# Patient Record
Sex: MALE | URBAN - METROPOLITAN AREA
[De-identification: names, ages, dates, MRNs, and addresses within clinical notes are randomized per-mention and may not be internally consistent; named-entity substitution may affect disease eponyms.]

---

## 2022-12-30 ENCOUNTER — OFFICE VISIT (OUTPATIENT)
Dept: URBAN - METROPOLITAN AREA CLINIC 28 | Facility: CLINIC | Age: 77
End: 2022-12-30

## 2022-12-30 DIAGNOSIS — H16.012 CENTRAL CORNEAL ULCER, LEFT EYE: ICD-10-CM

## 2022-12-30 DIAGNOSIS — B00.9 HERPES SIMPLEX INFECTION: Primary | ICD-10-CM

## 2022-12-30 PROCEDURE — 99203 OFFICE O/P NEW LOW 30 MIN: CPT | Performed by: OPTOMETRIST

## 2022-12-30 RX ORDER — ACYCLOVIR 400 MG/1
400 MG TABLET ORAL
Qty: 30 | Refills: 0 | Status: ACTIVE
Start: 2022-12-30

## 2022-12-30 RX ORDER — GANCICLOVIR 1.5 MG/G
0.15 % GEL OPHTHALMIC
Qty: 5 | Refills: 0 | Status: ACTIVE
Start: 2022-12-30

## 2022-12-30 ASSESSMENT — INTRAOCULAR PRESSURE
OD: 18
OS: 20

## 2022-12-30 NOTE — IMPRESSION/PLAN
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 See above.

## 2022-12-30 NOTE — IMPRESSION/PLAN
Impression: Herpes simplex infection: B00.9. Plan: Educated on exam findings. Educated on herpes simplex keratitis OS and impact on symptoms and vision. Start acyclovir 400mg PO TID and zirgan QID OS, both x 10 days. Monitor with f/u in 1 week or as needed.

## 2023-01-13 ENCOUNTER — OFFICE VISIT (OUTPATIENT)
Dept: URBAN - METROPOLITAN AREA CLINIC 28 | Facility: CLINIC | Age: 78
End: 2023-01-13

## 2023-01-13 DIAGNOSIS — H16.012 CENTRAL CORNEAL ULCER, LEFT EYE: ICD-10-CM

## 2023-01-13 DIAGNOSIS — B00.9 HERPES SIMPLEX INFECTION: Primary | ICD-10-CM

## 2023-01-13 PROCEDURE — 99213 OFFICE O/P EST LOW 20 MIN: CPT | Performed by: OPTOMETRIST

## 2023-01-13 RX ORDER — TRIFLURIDINE 1 G/100ML
1 % SOLUTION OPHTHALMIC
Qty: 5 | Refills: 0 | Status: ACTIVE
Start: 2023-01-13

## 2023-01-13 RX ORDER — TRIFLURIDINE 1 G/100ML
1 % SOLUTION OPHTHALMIC
Qty: 5 | Refills: 0 | Status: INACTIVE
Start: 2023-01-13 | End: 2023-01-13

## 2023-01-13 RX ORDER — ACYCLOVIR 400 MG/1
400 MG TABLET ORAL
Qty: 42 | Refills: 0 | Status: ACTIVE
Start: 2023-01-13

## 2023-01-13 ASSESSMENT — INTRAOCULAR PRESSURE
OD: 20
OS: 19

## 2023-01-13 NOTE — IMPRESSION/PLAN
Impression: Herpes simplex infection: B00.9. Plan: Educated on exam findings. Educated on herpes simplex keratitis OS and impact on symptoms and vision. Re-start acyclovir 400mg PO TID and trifluridine QID OS (zirgan was too expensive to renew), both x 2 weeks. Continue Systane ATs QID OS. Monitor with f/u in 1-2 weeks or as needed.

## 2024-02-02 ENCOUNTER — OFFICE VISIT (OUTPATIENT)
Dept: URBAN - METROPOLITAN AREA CLINIC 28 | Facility: CLINIC | Age: 79
End: 2024-02-02

## 2024-02-02 DIAGNOSIS — B00.9 HERPES SIMPLEX INFECTION: Primary | ICD-10-CM

## 2024-02-02 DIAGNOSIS — H16.012 CENTRAL CORNEAL ULCER, LEFT EYE: ICD-10-CM

## 2024-02-02 PROCEDURE — 99213 OFFICE O/P EST LOW 20 MIN: CPT | Performed by: OPTOMETRIST

## 2024-02-02 RX ORDER — ACYCLOVIR 400 MG/1
400 MG TABLET ORAL
Qty: 42 | Refills: 0 | Status: ACTIVE
Start: 2024-02-02

## 2024-02-02 RX ORDER — TRIFLURIDINE 1 G/100ML
1 % SOLUTION OPHTHALMIC
Qty: 5 | Refills: 0 | Status: ACTIVE
Start: 2024-02-02

## 2024-02-02 ASSESSMENT — KERATOMETRY
OD: 43.88
OS: 44.25

## 2024-02-02 ASSESSMENT — INTRAOCULAR PRESSURE
OD: 18
OS: 18